# Patient Record
Sex: FEMALE
[De-identification: names, ages, dates, MRNs, and addresses within clinical notes are randomized per-mention and may not be internally consistent; named-entity substitution may affect disease eponyms.]

---

## 2023-09-26 ENCOUNTER — NURSE TRIAGE (OUTPATIENT)
Dept: OTHER | Facility: CLINIC | Age: 68
End: 2023-09-26

## 2023-09-26 NOTE — TELEPHONE ENCOUNTER
Location of patient: [address] call from [place] with [facility]. Randi Infante MRN: [id number]    Subjective: Caller states \"I went to a function tonight and my friend brought me a kombucha to drink. That was about 4 to 4 1/2 hours ago. Is it safe for me to take my night time medications? \"    Triage RN encouraged pt to follow her medication regimen and use water to take PO meds. Please confer with your Provider about how to include Matheus Foster in your diet with your medications, especially if you take diabetes medications. Pt states understanding.